# Patient Record
Sex: MALE | Race: WHITE | NOT HISPANIC OR LATINO | Employment: OTHER | URBAN - METROPOLITAN AREA
[De-identification: names, ages, dates, MRNs, and addresses within clinical notes are randomized per-mention and may not be internally consistent; named-entity substitution may affect disease eponyms.]

---

## 2017-08-08 ENCOUNTER — OFFICE VISIT (OUTPATIENT)
Dept: ORTHOPEDIC SURGERY | Facility: CLINIC | Age: 52
End: 2017-08-08

## 2017-08-08 VITALS — BODY MASS INDEX: 30.75 KG/M2 | TEMPERATURE: 98.9 F | HEIGHT: 73 IN | WEIGHT: 232 LBS

## 2017-08-08 DIAGNOSIS — R29.898 WEAKNESS OF RIGHT FOOT: Primary | ICD-10-CM

## 2017-08-08 DIAGNOSIS — M54.50 SPINE PAIN, LUMBAR: Primary | ICD-10-CM

## 2017-08-08 DIAGNOSIS — M54.31 SCIATICA OF RIGHT SIDE: ICD-10-CM

## 2017-08-08 PROCEDURE — 99204 OFFICE O/P NEW MOD 45 MIN: CPT | Performed by: ORTHOPAEDIC SURGERY

## 2017-08-08 PROCEDURE — 72100 X-RAY EXAM L-S SPINE 2/3 VWS: CPT | Performed by: ORTHOPAEDIC SURGERY

## 2017-08-08 NOTE — PROGRESS NOTES
New patient or new problem visit    Chief Complaint   Patient presents with   • Lumbar Spine - Establish Care       HPI: He complains of chronic low back pain but increasing right foot numbness and weakness since June of this year.  He is been seeing Dr. Milo Bowers who ordered an MRI late last year, but Dr. Milo Bowers recently retired.  He is in pain management and is on OxyContin and I am and is undergone epidural injections without substantial relief.  Anus severe grinding stabbing and aching.  His wife is a surgical patient of mine.  Works as a  but his job as being impacted by his pain.    PFSH: See chart- reviewed    Review of Systems   Constitutional: Negative for chills, fever and unexpected weight change.   HENT: Negative.  Negative for trouble swallowing and voice change.    Eyes: Negative.  Negative for visual disturbance.   Respiratory: Negative.  Negative for cough and shortness of breath.    Cardiovascular: Positive for chest pain and leg swelling.   Gastrointestinal: Negative.  Negative for abdominal pain, nausea and vomiting.   Endocrine: Negative.  Negative for cold intolerance and heat intolerance.   Genitourinary: Negative.  Negative for difficulty urinating, frequency and urgency.   Musculoskeletal: Negative.    Skin: Negative.  Negative for rash and wound.   Allergic/Immunologic: Negative.  Negative for immunocompromised state.   Neurological: Positive for weakness and numbness.   Hematological: Bruises/bleeds easily.        Bleed easy   Psychiatric/Behavioral: Negative.  Negative for dysphoric mood. The patient is not nervous/anxious.        PE: Constitutional: Vital signs above-noted.  Awake, alert and oriented    Psychiatric: Affect and insight do not appear grossly disturbed.    Pulmonary: Breathing is unlabored, color is good.    Skin: Warm, dry and normal turgor    Cardiac:  pedal pulses intact.  No edema.    Eyesight and hearing appear adequate for examination  purposes      Musculoskeletal:  There is moderate tenderness to percussion and palpation of the spine. Motion appears stiff.  Posture is unremarkable to coronal and sagittal inspection.    The skin about the area is intact.  There is no palpable or visible deformity.  There is no local spasm.       Neurologic:   Reflexes are 2+ and symmetrical in the patellae and absent in the Achilles.   Motor function is undisturbed in quadriceps, EHL, and gastrocnemius on the left but he has 4/5 weakness in the right EHL.   Sensation appears symmetrically intact to light touch   .  In the bilateral lower extremities there is no evidence of atrophy.   Clonus is absent..  Gait appears undisturbed.  SLR test equivocal on the right      MEDICAL DECISION MAKING    XRAY: Plain film x-rays of the lumbar spine obtained today demonstrate degenerative disc changes of mild extent in satisfactory posture and alignment.  No comparison views are available.  MRI scan of the lumbar spine from November 2016 shows 3 level lumbar disc degeneration at the L3-4, 4-5 and L5-S1.  I see a bit of right L4 5 foraminal stenosis which was not commented on by the radiologist.  The radiologist noted 2 mm of retrolisthesis of L3 on 4.  I agree with the radiologist reading.    Other: n/a    Impression: Putting this together he has a long-standing back pain and multilevel lumbar disc degeneration upon which there is a more subacute history of right leg weakness.  He appears to have the L5 radiculopathy on exam.  The patient thinks that we need a new MRI scan and I agree entirely as this is a new finding.  I will call him with results of the MRI, but unless there is a new lesion which would be amenable to surgery I don't see any other recommendations to add to his care.  I did however reassure him that it would be safe to live with this without fear of much chance of catastrophic deterioration.    Plan: As above

## 2017-08-18 ENCOUNTER — TELEPHONE (OUTPATIENT)
Dept: ORTHOPEDIC SURGERY | Facility: CLINIC | Age: 52
End: 2017-08-18

## 2017-08-18 DIAGNOSIS — R29.898 WEAKNESS OF RIGHT FOOT: ICD-10-CM

## 2019-06-04 NOTE — TELEPHONE ENCOUNTER
"----- Message from Dave Long MD sent at 8/18/2017 12:13 PM EDT -----  Tell him that the MRI demonstrated mild degenerative changes, but no substantial evidence of nerve compression that I can't \"go after\".  At this point I suggest to just stay in pain management as I don't see anything additional to add to his care  "
LEFT MESSAGE FOR PT TO CALL BACK MG  
Patient has been informed.cld  
alone

## 2019-11-07 ENCOUNTER — OFFICE VISIT CONVERTED (OUTPATIENT)
Dept: NEUROSURGERY | Facility: CLINIC | Age: 54
End: 2019-11-07
Attending: PHYSICIAN ASSISTANT

## 2020-01-09 ENCOUNTER — OFFICE VISIT CONVERTED (OUTPATIENT)
Dept: NEUROSURGERY | Facility: CLINIC | Age: 55
End: 2020-01-09
Attending: NEUROLOGICAL SURGERY

## 2020-10-22 ENCOUNTER — OFFICE VISIT (OUTPATIENT)
Dept: ORTHOPEDIC SURGERY | Facility: CLINIC | Age: 55
End: 2020-10-22

## 2020-10-22 VITALS — HEIGHT: 72 IN | BODY MASS INDEX: 33.18 KG/M2 | WEIGHT: 245 LBS | TEMPERATURE: 94.8 F

## 2020-10-22 DIAGNOSIS — G90.521 COMPLEX REGIONAL PAIN SYNDROME I OF RIGHT LOWER LIMB: ICD-10-CM

## 2020-10-22 DIAGNOSIS — Z98.890 S/P PERONEAL TENDON REPAIR: ICD-10-CM

## 2020-10-22 DIAGNOSIS — M79.671 RIGHT FOOT PAIN: Primary | ICD-10-CM

## 2020-10-22 PROCEDURE — 99205 OFFICE O/P NEW HI 60 MIN: CPT | Performed by: ORTHOPAEDIC SURGERY

## 2020-10-22 RX ORDER — CLOPIDOGREL BISULFATE 75 MG/1
TABLET ORAL
COMMUNITY

## 2020-10-22 RX ORDER — ASPIRIN 325 MG
325 TABLET ORAL DAILY
COMMUNITY
End: 2023-02-02

## 2020-10-22 RX ORDER — GABAPENTIN 400 MG/1
CAPSULE ORAL
COMMUNITY

## 2020-10-22 RX ORDER — METOPROLOL SUCCINATE 50 MG/1
TABLET, EXTENDED RELEASE ORAL
COMMUNITY

## 2020-10-22 RX ORDER — SIMVASTATIN 40 MG
40 TABLET ORAL NIGHTLY
COMMUNITY
End: 2022-07-13

## 2020-10-22 RX ORDER — AMITRIPTYLINE HYDROCHLORIDE 25 MG/1
25 TABLET, FILM COATED ORAL
COMMUNITY
Start: 2020-08-06 | End: 2022-07-13

## 2020-10-22 RX ORDER — METFORMIN HYDROCHLORIDE 500 MG/1
TABLET, EXTENDED RELEASE ORAL
COMMUNITY
Start: 2020-08-26 | End: 2022-07-13

## 2020-10-22 NOTE — PROGRESS NOTES
NEW VISIT    Patient: Robson Cohen  ?  YOB: 1965    MRN: [id number]  ?  Chief Complaint   Patient presents with   • Right Ankle - Pain, Establish Care, Injury      ?  HPI: This patient presents to the office with a very complex history.  He was injured in June 2017.  On 10 Anabelle 2017 he had a heavy set of ceramic tile fall onto his right ankle.  He sustained a deep laceration.  Apparently at that time he sustained an injury to the anterior aspect of the ankle and possibly a terminal branch of the common peroneal nerve.  He was initially treated conservatively and then was treated by Dr. Rk Kan a podiatrist in [place].  He underwent a surgical procedure which was related to this damage to the anterior aspect of his leg.  The patient is not quite clear as far as the exact extent of the surgical procedure.  Subsequent to that he continued to have a lot of pain and discomfort over the dorsal aspect of his right great toe.  He was told he has arthritis of the great toe.  A spacer was placed during surgery at a subsequent approach.  He was then told that the spacer had failed and has since undergone a fusion of the MTP joint.  He was also told that he had injury to the peroneus longus tendon and possibly a tear that was addressed with a repair.  The patient has continued to have a lot of pain and discomfort.  He cannot walk for more than 2 to 3 hours.  He has a lot of swelling of the lateral side of the ankle.  He states that he has lost his job as a  because he cannot go up and down ladders because of his right ankle.  He describes his pain as sharp shooting pain with associated burning and stabbing.  The patient was seen at the pain clinic and states that LESI did not help manage his symptoms whatsoever because of continued pain and discomfort.  He has been on Neurontin which is somewhat beneficial in managing his symptoms.  He states that he would like to enjoy life once again  but is unable to do so because of the disabling pain in his foot and ankle.  He has been since placed on an antidepressant by his PCP.    Pain Location: right foot and right ankle  Radiation: From the lateral aspect of the ankle to the anterior aspect with radiation to the dorsal aspect of the foot.  Quality: stabbing, burning  Intensity/Severity: severe  Duration: 3 years  Onset quality: gradual   Timing: constant  Aggravating Factors: rising after sitting and squatting  Alleviating Factors: ambulating  Previous Episodes: yes  Associated Symptoms: decreased ROM, decreased strength, numbness/tingling  ADL Affected: ambulating  Previous Treatment: Prior operations on the foot and ankle x2 in [facility].    This patient is a new patient.  This problem is new to this examiner.      Allergies: No Known Allergies    Medications:   Home Medications:  Current Outpatient Medications on File Prior to Visit   Medication Sig   • amitriptyline (ELAVIL) 25 MG tablet Take 25 mg by mouth every night at bedtime.   • aspirin 325 MG tablet Take 325 mg by mouth Daily.   • clopidogrel (PLAVIX) 75 MG tablet clopidogrel 75 mg tablet   • gabapentin (NEURONTIN) 400 MG capsule gabapentin 400 mg capsule   • metFORMIN ER (GLUCOPHAGE-XR) 500 MG 24 hr tablet TAKE ONE TABLET BY MOUTH EVERY DAY WITH EVENING MEAL TO DECREASE RISK OF DIABETES   • metoprolol succinate XL (TOPROL-XL) 50 MG 24 hr tablet metoprolol succinate ER 50 mg tablet,extended release 24 hr   • simvastatin (ZOCOR) 40 MG tablet simvastatin 40 mg tablet     No current facility-administered medications on file prior to visit.      Current Medications:  Scheduled Meds:  PRN Meds:.    I have reviewed the patient's medical history in detail and updated the computerized patient record.  Review and summarization of old records include:    No past medical history on file.  Past Surgical History:   Procedure Laterality Date   • TENDON REPAIR Right 04/28/2019    ankle   • TOE  "SURGERY Right 04/28/2019    great toe fusion     Social History     Occupational History   • Not on file   Tobacco Use   • Smoking status: Current Every Day Smoker     Packs/day: 0.50     Types: Cigarettes   • Smokeless tobacco: Never Used   Substance and Sexual Activity   • Alcohol use: No   • Drug use: No   • Sexual activity: Defer      Social History     Social History Narrative   • Not on file     No family history on file.      Review of Systems  Constitutional: Negative for appetite change.   HENT: Negative.    Eyes: Negative.    Respiratory: Negative.    Cardiovascular: Negative.    Gastrointestinal: Negative.    Endocrine: Negative.    Genitourinary: Negative.    Musculoskeletal: See details of exam below.  Skin: Negative.    Allergic/Immunologic: Negative.    Hematological: Negative.    Psychiatric/Behavioral: Negative.         Wt Readings from Last 3 Encounters:   10/22/20 111 kg (245 lb)   08/08/17 105 kg (232 lb)     Ht Readings from Last 3 Encounters:   10/22/20 182.9 cm (72\")   08/08/17 185.4 cm (73\")     Body mass index is 33.23 kg/m².  Facility age limit for growth percentiles is 20 years.  Vitals:    10/22/20 0843   Temp: 94.8 °F (34.9 °C)         Physical Exam  Constitutional: Patient is oriented to person, place, and time. Appears well-developed and well-nourished.   HENT:   Head: Normocephalic and atraumatic.   Eyes: Conjunctivae and EOM are normal. Pupils are equal, round, and reactive to light.   Cardiovascular: Normal rate, regular rhythm, normal heart sounds and intact distal pulses.   Pulmonary/Chest: Effort normal and breath sounds normal.   Musculoskeletal:   See detailed exam below   Neurological: Alert and oriented to person, place, and time. No sensory deficit. Coordination normal.   Skin: Skin is warm and dry. Capillary refill takes less than 2 seconds. No rash noted. No erythema.   Psychiatric: Patient has a normal mood and affect. His behavior is normal. Judgment and thought content " normal.   Nursing note and vitals reviewed.      Ortho Exam:   Right foot and ankle.  The patient has multiple healed incisions.  There is one incision laterally over the posterior aspect of the fibula.  Tinel sign is positive over the common peroneal nerve laterally.  He does have swollen and tense shiny skin.  There is some atrophy of the nails.  There is no question that he does appear to have a partly burnt out complex regional pain syndrome/reflex sympathetic dystrophy.  Ankle dorsiflexion 0 to 10 degrees.  Eversion is associated with pain and discomfort.  Ankle plantarflexion 0 to 30 degrees.  He finds it very difficult to circumduct the ankle.  The cap refill is 3 seconds with a delay as would be consistent with vascular issues with the ankle.  The dorsalis pedis artery pulses are palpable.  He does have a healed incision on the dorsal aspect of the first MTP joint of the great toe.  Dorsi and plantar flexion of the great toe MTP joint is significantly restricted consistent with a fusion.  His gait is cautious and somewhat antalgic.  He has a stocking type of edema starting in the supramalleolar region.      Diagnostics:  no diagnostic testing performed this visit   Previously obtained x-rays are reviewed.  The ankle joint itself appears to be fairly well-preserved.    EMG/nerve conduction study images are available in the office today.    There is abnormal EMG study.  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  There is no evidence of any proximal neuropathy.    MRI images from a previous visit are reviewed.  There is evidence of a partial-thickness tear longitudinally oriented involving the peroneus brevis tendon.  There is no evidence of acute ankle injury.    Assessment:  Diagnoses and all orders for this visit:    1. Right foot pain (Primary)  -     Ambulatory Referral to Orthopedic Surgery    2.  S/P peroneal tendon repair  -     Ambulatory Referral to Orthopedic Surgery          Procedures  ?    Plan    · Compression/brace  · Rest, ice, compression, and elevation (RICE) therapy  · Stretching and strengthening exercises of the dorsiflexors and plantar flexors of the ankle.  · This patient has brought me a very difficult problem to treat and at this point I would recommend a multimodal approach for this patient.  · Discussed with him about getting diagnostic caudal blocks at the pain clinic to help manage and diagnose the symptoms of reflex sympathetic dystrophy.  · I will have to refer this patient to Dr. Juan Pablo Conde for subspecialty foot and ankle care because I do not think that he is getting the full extent of foot and ankle subspecialty care at his podiatrist's office.  · Vitamin B6, B12 100 mcg tab 1 p.o. daily for nerve function improvement.  · Systemic vitamin B levels need to be evaluated and he will have his primary care physician help him with that blood level.  · He is already on a prescription of Neurontin 300 mg tab 1 p.o. nightly for nerve symptoms.  · Jobst compression garment stocking, 20/30 compression to help reduce the swelling peripherally.  · Supportive active ankle brace to prevent the ankle from buckling and giving out.  · OTC Tylenol 500-1000mg by mouth every 6 hours as needed for pain   · Follow up in 3 month(s).  · This was a very complex decision making case with reviewing of previous op notes, MRIs and EMG/MRI.  Time spent in the office today with the patient 60 minutes of which greater than 50% of my time was spent face-to-face with the patient going through treatment options and alignment of further care going forward.    Date of encounter: 10/22/2020   Gilson South MD

## 2020-11-17 ENCOUNTER — TELEPHONE (OUTPATIENT)
Dept: ORTHOPEDIC SURGERY | Facility: CLINIC | Age: 55
End: 2020-11-17

## 2021-05-15 VITALS
WEIGHT: 234.19 LBS | SYSTOLIC BLOOD PRESSURE: 120 MMHG | HEIGHT: 72 IN | DIASTOLIC BLOOD PRESSURE: 73 MMHG | BODY MASS INDEX: 31.72 KG/M2

## 2021-05-15 VITALS — HEIGHT: 72 IN | BODY MASS INDEX: 31.42 KG/M2 | WEIGHT: 232 LBS

## 2022-03-30 ENCOUNTER — LAB REQUISITION (OUTPATIENT)
Dept: LAB | Facility: HOSPITAL | Age: 57
End: 2022-03-30

## 2022-03-30 DIAGNOSIS — M20.21 HALLUX RIGIDUS, RIGHT FOOT: ICD-10-CM

## 2022-03-30 PROCEDURE — 88311 DECALCIFY TISSUE: CPT | Performed by: ORTHOPAEDIC SURGERY

## 2022-03-30 PROCEDURE — 88305 TISSUE EXAM BY PATHOLOGIST: CPT | Performed by: ORTHOPAEDIC SURGERY

## 2022-03-31 LAB
LAB AP CASE REPORT: NORMAL
PATH REPORT.FINAL DX SPEC: NORMAL
PATH REPORT.GROSS SPEC: NORMAL

## 2022-07-12 PROBLEM — Z98.61 CAD S/P PERCUTANEOUS CORONARY ANGIOPLASTY: Status: ACTIVE | Noted: 2022-07-12

## 2022-07-12 PROBLEM — I25.10 CAD S/P PERCUTANEOUS CORONARY ANGIOPLASTY: Status: ACTIVE | Noted: 2022-07-12

## 2022-07-12 PROBLEM — E78.5 DYSLIPIDEMIA: Status: ACTIVE | Noted: 2022-07-12

## 2022-07-12 NOTE — PROGRESS NOTES
Chief Complaint  Hyperlipidemia and Hypertension      History of Present Illness  Robson Cohen presents to [facility]  Patient is a 57-year-old with a previous history of CAD with stenting to his RCA with in-stent restenosis and most recently stented in 2016 has had a recurrent issues of chest discomfort.  He has had recurrent episodes of chest discomfort occurring about once a month for the last several years with radiation up his right neck associate with a headache afterwards.  The spells typically last for 20 minutes or they does peer to be any causative triggers to these episodes he denies any nausea vomiting diaphoresis he does get improvement with nitroglycerin use.    Past Medical History:   Diagnosis Date   • CAD (coronary artery disease)    • Hyperlipidemia    • Hypertension          Current Outpatient Medications:   •  aspirin 325 MG tablet, Take 325 mg by mouth Daily., Disp: , Rfl:   •  clopidogrel (PLAVIX) 75 MG tablet, clopidogrel 75 mg tablet, Disp: , Rfl:   •  gabapentin (NEURONTIN) 400 MG capsule, gabapentin 400 mg capsule, Disp: , Rfl:   •  metoprolol succinate XL (TOPROL-XL) 50 MG 24 hr tablet, metoprolol succinate ER 50 mg tablet,extended release 24 hr, Disp: , Rfl:   •  amLODIPine (NORVASC) 2.5 MG tablet, Take 1 tablet by mouth Daily., Disp: 90 tablet, Rfl: 3    Medications Discontinued During This Encounter   Medication Reason   • amitriptyline (ELAVIL) 25 MG tablet *Therapy completed   • metFORMIN ER (GLUCOPHAGE-XR) 500 MG 24 hr tablet *Therapy completed   • simvastatin (ZOCOR) 40 MG tablet      No Known Allergies     Social History     Tobacco Use   • Smoking status: Current Every Day Smoker     Packs/day: 0.25     Types: Cigarettes, Pipe   • Smokeless tobacco: Never Used   Substance Use Topics   • Alcohol use: No   • Drug use: No       Family History   Problem Relation Age of Onset   • Heart attack Mother    • Stroke Mother         Objective     /78    "Pulse 57   Ht 182.9 cm (72\")   Wt 103 kg (226 lb 9.6 oz)   BMI 30.73 kg/m²       Physical Exam    General Appearance:   · no acute distress  · Alert and oriented x3  HENT:   · lips not cyanotic  · Atraumatic  Neck:  · No jvd   · supple  Respiratory:  · no respiratory distress  · normal breath sounds  · no rales  Cardiovascular:  · Regular rate and rhythm  · no S3, no S4   · no murmur  · no rub  Extremities  · No cyanosis  · lower extremity edema: none    Skin:   · warm, dry  · No rashes    Result Review :     No results found for: PROBNP                        ECG 12 Lead    Date/Time: 7/13/2022 12:42 PM  Performed by: Juan Pablo Hogue MD  Authorized by: Juan Pablo Hogue MD   Comparison: compared with previous ECG   Similar to previous ECG  Rhythm: sinus rhythm           No results found for this or any previous visit.             The ASCVD Risk score ([place] BERG Jr., et al., 2013) failed to calculate for the following reasons:    Cannot find a previous HDL lab    Cannot find a previous total cholesterol lab     Diagnoses and all orders for this visit:    1. CAD S/P percutaneous coronary angioplasty (Primary)  Overview:  Stent to rca 2016 for intstent restenosis    Assessment & Plan:  Patient with intermittent episodes of chest discomfort not exertional relieved with nitro longstanding in nature but not remitting his previous work-up had included a stress test over a year ago which did not show ischemia and heart catheterization in 2018 discussed with the patient the possibility that this Pentam's could be vasospastic in nature and recommended a trial of Norvasc 2.5 once a day as he has had problems with headache with long-acting nitro.  In addition he can still continue take as needed doses of his nitroglycerin as needed for chest pain episodes if he was to have chest pain not relieved with 3 nitros recommended going to the hospital via EMS.  Also discussed with him the risk and benefits of further work-up given his " prior work-ups not revealing any significant epicardial disease he felt since it has been sometime since he has had a heart catheterization that it would be worth the risk to take a look again with an invasive strategy.  Did discuss with him the risk of death, heart attack, stroke and kidney dysfunction and he was agreeable to proceeding.  Patient continues to increase his chronic aspirin dose to 81 mg daily      2. Dyslipidemia  Assessment & Plan:  Since patient has had a Norvasc to his medical regiment we will change his Zocor to Pravachol 40 nightly      Other orders  -     amLODIPine (NORVASC) 2.5 MG tablet; Take 1 tablet by mouth Daily.  Dispense: 90 tablet; Refill: 3          Follow Up     Return in about 6 months (around 1/13/2023).          Patient was given instructions and counseling regarding his condition or for health maintenance advice. Please see specific information pulled into the AVS if appropriate.

## 2022-07-13 ENCOUNTER — PREP FOR SURGERY (OUTPATIENT)
Dept: OTHER | Facility: HOSPITAL | Age: 57
End: 2022-07-13

## 2022-07-13 ENCOUNTER — OFFICE VISIT (OUTPATIENT)
Dept: CARDIOLOGY | Facility: CLINIC | Age: 57
End: 2022-07-13

## 2022-07-13 VITALS
HEIGHT: 72 IN | SYSTOLIC BLOOD PRESSURE: 130 MMHG | BODY MASS INDEX: 30.69 KG/M2 | HEART RATE: 57 BPM | WEIGHT: 226.6 LBS | DIASTOLIC BLOOD PRESSURE: 78 MMHG

## 2022-07-13 DIAGNOSIS — E78.5 DYSLIPIDEMIA: ICD-10-CM

## 2022-07-13 DIAGNOSIS — Z98.61 CAD S/P PERCUTANEOUS CORONARY ANGIOPLASTY: Primary | ICD-10-CM

## 2022-07-13 DIAGNOSIS — I20.8 CHRONIC STABLE ANGINA: Primary | ICD-10-CM

## 2022-07-13 DIAGNOSIS — I25.10 CAD S/P PERCUTANEOUS CORONARY ANGIOPLASTY: Primary | ICD-10-CM

## 2022-07-13 PROCEDURE — 99204 OFFICE O/P NEW MOD 45 MIN: CPT | Performed by: INTERNAL MEDICINE

## 2022-07-13 PROCEDURE — 93000 ELECTROCARDIOGRAM COMPLETE: CPT | Performed by: INTERNAL MEDICINE

## 2022-07-13 RX ORDER — AMLODIPINE BESYLATE 2.5 MG/1
2.5 TABLET ORAL DAILY
Qty: 90 TABLET | Refills: 3 | Status: SHIPPED | OUTPATIENT
Start: 2022-07-13

## 2022-07-13 RX ORDER — PRAVASTATIN SODIUM 40 MG
40 TABLET ORAL DAILY
Qty: 90 TABLET | Refills: 3 | Status: SHIPPED | OUTPATIENT
Start: 2022-07-13

## 2022-07-13 NOTE — ASSESSMENT & PLAN NOTE
Patient with intermittent episodes of chest discomfort not exertional relieved with nitro longstanding in nature but not remitting his previous work-up had included a stress test over a year ago which did not show ischemia and heart catheterization in 2018 discussed with the patient the possibility that this Pentam's could be vasospastic in nature and recommended a trial of Norvasc 2.5 once a day as he has had problems with headache with long-acting nitro.  In addition he can still continue take as needed doses of his nitroglycerin as needed for chest pain episodes if he was to have chest pain not relieved with 3 nitros recommended going to the hospital via EMS.  Also discussed with him the risk and benefits of further work-up given his prior work-ups not revealing any significant epicardial disease he felt since it has been sometime since he has had a heart catheterization that it would be worth the risk to take a look again with an invasive strategy.  Did discuss with him the risk of death, heart attack, stroke and kidney dysfunction and he was agreeable to proceeding.  Patient continues to increase his chronic aspirin dose to 81 mg daily

## 2022-07-13 NOTE — ASSESSMENT & PLAN NOTE
Since patient has had a Norvasc to his medical regiment we will change his Zocor to Pravachol 40 nightly

## 2022-07-14 PROBLEM — I20.89 CHRONIC STABLE ANGINA: Status: ACTIVE | Noted: 2022-07-14

## 2022-07-14 PROBLEM — I20.8 CHRONIC STABLE ANGINA: Status: ACTIVE | Noted: 2022-07-14

## 2022-07-25 DIAGNOSIS — Z98.61 CAD S/P PERCUTANEOUS CORONARY ANGIOPLASTY: Primary | ICD-10-CM

## 2022-07-25 DIAGNOSIS — I25.10 CAD S/P PERCUTANEOUS CORONARY ANGIOPLASTY: Primary | ICD-10-CM

## 2022-08-23 DIAGNOSIS — Z98.61 CAD S/P PERCUTANEOUS CORONARY ANGIOPLASTY: ICD-10-CM

## 2022-08-23 DIAGNOSIS — I25.10 CAD S/P PERCUTANEOUS CORONARY ANGIOPLASTY: ICD-10-CM

## 2023-02-02 ENCOUNTER — OFFICE VISIT (OUTPATIENT)
Dept: CARDIOLOGY | Facility: CLINIC | Age: 58
End: 2023-02-02
Payer: MEDICARE

## 2023-02-02 VITALS
DIASTOLIC BLOOD PRESSURE: 95 MMHG | BODY MASS INDEX: 30.75 KG/M2 | SYSTOLIC BLOOD PRESSURE: 130 MMHG | WEIGHT: 227 LBS | HEART RATE: 63 BPM | HEIGHT: 72 IN

## 2023-02-02 DIAGNOSIS — I25.10 CAD S/P PERCUTANEOUS CORONARY ANGIOPLASTY: Primary | ICD-10-CM

## 2023-02-02 DIAGNOSIS — I20.8 CHRONIC STABLE ANGINA: ICD-10-CM

## 2023-02-02 DIAGNOSIS — Z98.61 CAD S/P PERCUTANEOUS CORONARY ANGIOPLASTY: Primary | ICD-10-CM

## 2023-02-02 DIAGNOSIS — E78.5 DYSLIPIDEMIA: ICD-10-CM

## 2023-02-02 PROCEDURE — 99214 OFFICE O/P EST MOD 30 MIN: CPT | Performed by: INTERNAL MEDICINE

## 2023-02-02 RX ORDER — ICOSAPENT ETHYL 1000 MG/1
CAPSULE ORAL
COMMUNITY
Start: 2023-01-12

## 2023-02-02 RX ORDER — FEBUXOSTAT 40 MG/1
40 TABLET, FILM COATED ORAL DAILY
COMMUNITY
Start: 2022-12-28

## 2023-02-02 RX ORDER — ASPIRIN 81 MG/1
81 TABLET, CHEWABLE ORAL DAILY
COMMUNITY

## 2023-02-02 NOTE — PROGRESS NOTES
"Chief Complaint  Coronary Artery Disease and Follow-up (Stress test done would like to discuss )    Subjective    Patient with resolution of his anginal symptoms cannot remember having any chest pain significantly since last visit.  He does not report any myalgias symptoms    Past Medical History:   Diagnosis Date   • CAD (coronary artery disease)    • Hyperlipidemia    • Hypertension          Current Outpatient Medications:   •  amLODIPine (NORVASC) 2.5 MG tablet, Take 1 tablet by mouth Daily., Disp: 90 tablet, Rfl: 3  •  aspirin 81 MG chewable tablet, Chew 81 mg Daily., Disp: , Rfl:   •  clopidogrel (PLAVIX) 75 MG tablet, clopidogrel 75 mg tablet, Disp: , Rfl:   •  febuxostat (ULORIC) 40 MG tablet, Take 40 mg by mouth Daily., Disp: , Rfl:   •  gabapentin (NEURONTIN) 400 MG capsule, gabapentin 400 mg capsule, Disp: , Rfl:   •  metoprolol succinate XL (TOPROL-XL) 50 MG 24 hr tablet, metoprolol succinate ER 50 mg tablet,extended release 24 hr, Disp: , Rfl:   •  pravastatin (Pravachol) 40 MG tablet, Take 1 tablet by mouth Daily., Disp: 90 tablet, Rfl: 3  •  Vascepa 1 g capsule capsule, TAKE TWO CAPSULES BY MOUTH TWICE DAILY WITH FOOD, swallowing whole. DO not chew, open, dissolve, and/or crush, Disp: , Rfl:     Medications Discontinued During This Encounter   Medication Reason   • aspirin 325 MG tablet      No Known Allergies     Social History     Tobacco Use   • Smoking status: Every Day     Packs/day: 0.25     Types: Cigarettes, Pipe   • Smokeless tobacco: Never   Vaping Use   • Vaping Use: Never used   Substance Use Topics   • Alcohol use: No   • Drug use: No       Family History   Problem Relation Age of Onset   • Heart attack Mother    • Stroke Mother         Objective     /95   Pulse 63   Ht 182.9 cm (72\")   Wt 103 kg (227 lb)   BMI 30.79 kg/m²       Physical Exam    General Appearance:   · no acute distress  · Alert and oriented x3  HENT:   · lips not cyanotic  · Atraumatic  Neck:  · No jvd "   · supple  Respiratory:  · no respiratory distress  · normal breath sounds  · no rales  Cardiovascular:  · Regular rate and rhythm  · no S3, no S4   · no murmur  · no rub  Extremities  · No cyanosis  · lower extremity edema: none    Skin:   · warm, dry  · No rashes      Result Review :     No results found for: PROBNP                     Diagnoses and all orders for this visit:    1. CAD S/P percutaneous coronary angioplasty (Primary)  Assessment & Plan:  Patient has not had any significant chest pain the last visit had previously discussed possible heart catheterization you know stress test have been within normal limits given his anginal symptoms.  Since patient symptoms have resolved discussed just medical therapy versus heart catheterization and agreed upon continuing with conservative medical treatment for the time being.  If patient develops any recurrent anginal chest pain symptoms then pursue heart catheterization.  Continue on aspirin/Plavix daily, Norvasc 2.5 daily and Toprol 50 mg once a day      2. Chronic stable angina (HCC)  Assessment & Plan:  Resolution of any anginal symptoms stress test in July/22 did not show any ischemic issues      3. Dyslipidemia  Assessment & Plan:  Continue pravastatin 40 nightly checking lipids to see if at goal    Orders:  -     Lipid Panel; Future  -     Hepatic Function Panel; Future          Follow Up     Return in about 6 months (around 8/2/2023) for EKG with F/U.          Patient was given instructions and counseling regarding his condition or for health maintenance advice. Please see specific information pulled into the AVS if appropriate.

## 2023-02-02 NOTE — ASSESSMENT & PLAN NOTE
Patient has not had any significant chest pain the last visit had previously discussed possible heart catheterization you know stress test have been within normal limits given his anginal symptoms.  Since patient symptoms have resolved discussed just medical therapy versus heart catheterization and agreed upon continuing with conservative medical treatment for the time being.  If patient develops any recurrent anginal chest pain symptoms then pursue heart catheterization.  Continue on aspirin/Plavix daily, Norvasc 2.5 daily and Toprol 50 mg once a day

## 2023-04-26 ENCOUNTER — TELEPHONE (OUTPATIENT)
Dept: GASTROENTEROLOGY | Facility: CLINIC | Age: 58
End: 2023-04-26
Payer: MEDICARE

## 2023-04-26 NOTE — TELEPHONE ENCOUNTER
PCP office contacted our office to ask if patient had been scheduled yet I advised he was scheduled for 3/30/2023 however per chart the patient canceled the appointment. She states the patient wanted to be scheduled with an MD and wanted to know why this was not done I advised at this time Dr.Laura Bloom is not accepting new patients. She states understanding

## 2023-08-24 ENCOUNTER — OFFICE VISIT (OUTPATIENT)
Dept: CARDIOLOGY | Facility: CLINIC | Age: 58
End: 2023-08-24
Payer: MEDICARE

## 2023-08-24 VITALS
HEART RATE: 70 BPM | BODY MASS INDEX: 30.34 KG/M2 | DIASTOLIC BLOOD PRESSURE: 85 MMHG | SYSTOLIC BLOOD PRESSURE: 116 MMHG | HEIGHT: 72 IN | WEIGHT: 224 LBS

## 2023-08-24 DIAGNOSIS — I25.10 CAD S/P PERCUTANEOUS CORONARY ANGIOPLASTY: ICD-10-CM

## 2023-08-24 DIAGNOSIS — Z98.61 CAD S/P PERCUTANEOUS CORONARY ANGIOPLASTY: ICD-10-CM

## 2023-08-24 DIAGNOSIS — E78.5 DYSLIPIDEMIA: Primary | ICD-10-CM

## 2023-08-24 DIAGNOSIS — I20.8 CHRONIC STABLE ANGINA: ICD-10-CM

## 2023-08-24 DIAGNOSIS — Z72.0 TOBACCO USE: ICD-10-CM

## 2023-08-24 RX ORDER — OXYCODONE HYDROCHLORIDE 10 MG/1
10 TABLET ORAL
COMMUNITY
Start: 2023-08-18

## 2023-08-24 RX ORDER — NITROGLYCERIN 0.4 MG/1
0.4 TABLET SUBLINGUAL
COMMUNITY
Start: 2023-04-24

## 2023-08-24 RX ORDER — CLONAZEPAM 0.5 MG
0.5 TABLET ORAL DAILY
COMMUNITY
Start: 2023-08-18

## 2023-08-24 RX ORDER — ERGOCALCIFEROL 1.25 MG/1
1 CAPSULE ORAL WEEKLY
COMMUNITY
Start: 2023-08-12

## 2023-08-24 NOTE — ASSESSMENT & PLAN NOTE
Robson Cohen  reports that he has been smoking cigarettes and pipe. He has been smoking an average of .25 packs per day. He has never used smokeless tobacco.. I have educated him on the risk of diseases from using tobacco products such as cancer, COPD, and heart disease.     I advised him to quit and he is willing to quit. We have discussed the following method/s for tobacco cessation:  Education Material Counseling Prescription Medicaiton.  Together we have set a quit date for 1 week from today.  He will follow up with me in 6 month or sooner to check on his progress.    I spent 4.5 minutes counseling the patient.

## 2023-08-24 NOTE — PROGRESS NOTES
Chief Complaint  Follow-up and Coronary Artery Disease    Subjective    Patient is a 58-year-old with previous CAD and stenting who has had stable chest pain issues no new complaints or problems.  He does not report any increased shortness of breath symptoms  Past Medical History:   Diagnosis Date    CAD (coronary artery disease)     Hyperlipidemia     Hypertension          Current Outpatient Medications:     amLODIPine (NORVASC) 2.5 MG tablet, Take 1 tablet by mouth Daily., Disp: 90 tablet, Rfl: 3    aspirin 81 MG chewable tablet, Chew 1 tablet Daily., Disp: , Rfl:     clopidogrel (PLAVIX) 75 MG tablet, clopidogrel 75 mg tablet, Disp: , Rfl:     febuxostat (ULORIC) 40 MG tablet, Take 1 tablet by mouth Daily., Disp: , Rfl:     gabapentin (NEURONTIN) 400 MG capsule, gabapentin 400 mg capsule, Disp: , Rfl:     KlonoPIN 0.5 MG tablet, Take 1 tablet by mouth Daily., Disp: , Rfl:     metoprolol succinate XL (TOPROL-XL) 50 MG 24 hr tablet, metoprolol succinate ER 50 mg tablet,extended release 24 hr, Disp: , Rfl:     nitroglycerin (NITROSTAT) 0.4 MG SL tablet, Place 1 tablet under the tongue Every 5 (Five) Minutes As Needed., Disp: , Rfl:     oxyCODONE (ROXICODONE) 10 MG tablet, Take 1 tablet by mouth Every 3 (Three) Hours As Needed., Disp: , Rfl:     pravastatin (Pravachol) 40 MG tablet, Take 1 tablet by mouth Daily., Disp: 90 tablet, Rfl: 3    Vascepa 1 g capsule capsule, TAKE TWO CAPSULES BY MOUTH TWICE DAILY WITH FOOD, swallowing whole. DO not chew, open, dissolve, and/or crush, Disp: , Rfl:     vitamin D (ERGOCALCIFEROL) 1.25 MG (26031 UT) capsule capsule, Take 1 capsule by mouth 1 (One) Time Per Week., Disp: , Rfl:     nicotine (Nicoderm CQ) 7 MG/24HR patch, Place 1 patch on the skin as directed by provider Daily., Disp: 30 patch, Rfl: 5    There are no discontinued medications.  No Known Allergies     Social History     Tobacco Use    Smoking status: Every Day     Packs/day: 0.25     Types: Cigarettes, Pipe     "Smokeless tobacco: Never   Vaping Use    Vaping Use: Never used   Substance Use Topics    Alcohol use: No    Drug use: No       Family History   Problem Relation Age of Onset    Heart attack Mother     Stroke Mother         Objective     /85   Pulse 70   Ht 182.9 cm (72\")   Wt 102 kg (224 lb)   BMI 30.38 kg/mý       Physical Exam    General Appearance:   no acute distress  Alert and oriented x3  HENT:   lips not cyanotic  Atraumatic  Neck:  No jvd   supple  Respiratory:  no respiratory distress  normal breath sounds  no rales  Cardiovascular:  Regular rate and rhythm  no S3, no S4   no murmur  no rub  Extremities  No cyanosis  lower extremity edema: none    Skin:   warm, dry  No rashes      Result Review :     No results found for: PROBNP       No results found for: TSH   No results found for: FREET4   No results found for: DDIMERQUANT  No results found for: MG   No results found for: DIGOXIN   No results found for: TROPONINT          No results found for: POCTROP         ECG 12 Lead    Date/Time: 8/24/2023 10:17 AM  Performed by: Juan Pablo Hogue MD  Authorized by: Juan Pablo Hogue MD   Comparison: compared with previous ECG   Similar to previous ECG               Diagnoses and all orders for this visit:    1. Dyslipidemia (Primary)  Assessment & Plan:  Continue with pravastatin 40 nightly repeat lipids LFTs to see if at goal    Orders:  -     Lipid Panel; Future  -     Hepatic Function Panel; Future    2. CAD S/P percutaneous coronary angioplasty  Assessment & Plan:  Insert aspirin 81 and Plavix 75 daily    Orders:  -     ECG 12 Lead    3. Chronic stable angina  Assessment & Plan:  Continue with Toprol 50 and amlodipine 2.5 mg once a day doing well symptomatically currently      4. Tobacco use  Assessment & Plan:  Robson Cohen  reports that he has been smoking cigarettes and pipe. He has been smoking an average of .25 packs per day. He has never used smokeless tobacco.. I have educated him on the risk of " diseases from using tobacco products such as cancer, COPD, and heart disease.     I advised him to quit and he is willing to quit. We have discussed the following method/s for tobacco cessation:  Education Material Counseling Prescription Medicaiton.  Together we have set a quit date for 1 week from today.  He will follow up with me in 6 month or sooner to check on his progress.    I spent 4.5 minutes counseling the patient.           Other orders  -     nicotine (Nicoderm CQ) 7 MG/24HR patch; Place 1 patch on the skin as directed by provider Daily.  Dispense: 30 patch; Refill: 5            Follow Up     Return in about 6 months (around 2/24/2024) for Follow with Nyasia Cruz.          Patient was given instructions and counseling regarding his condition or for health maintenance advice. Please see specific information pulled into the AVS if appropriate.

## 2024-06-19 ENCOUNTER — TELEPHONE (OUTPATIENT)
Dept: CARDIOLOGY | Facility: CLINIC | Age: 59
End: 2024-06-19
Payer: MEDICARE

## 2024-06-19 NOTE — TELEPHONE ENCOUNTER
Procedure: Spinal Cord Stimulator Trial    Med Directive: Plavix 10-14 days    PMH:  CAD with stent 2016, HLD    Last Seen: 8/24/23

## 2024-06-19 NOTE — TELEPHONE ENCOUNTER
The [facility] received a fax that requires your attention. The document has been indexed to the patient’s chart for your review.      Reason for sending: EXTERNAL MEDICAL RECORD NOTIFICATION    Documents Description: CARDIAC CLEARANCE    Name of Sender: DR JEAN CARLOS WHITFIELD WITH [facility]     Date Indexed: 6/19/24    Notes (if needed): SEE FAX IN CHART UNDER CARE COORDINATION

## 2025-01-16 ENCOUNTER — TELEPHONE (OUTPATIENT)
Dept: CARDIOLOGY | Facility: CLINIC | Age: 60
End: 2025-01-16
Payer: MEDICARE

## 2025-01-16 NOTE — TELEPHONE ENCOUNTER
Caller: Robson Cohen    Relationship to patient: Self    Best call back number:   Telephone Information:   Mobile [phone]       Chief complaint: NOT FEELING VERY WELL    Type of visit: FOLLOW UP    Requested date: ASAP       Additional notes:LAST SEEN 8-24-23

## 2025-03-25 ENCOUNTER — TELEPHONE (OUTPATIENT)
Dept: CARDIOLOGY | Facility: CLINIC | Age: 60
End: 2025-03-25
Payer: MEDICARE

## 2025-03-25 NOTE — TELEPHONE ENCOUNTER
SW patient. Patient states he has not been taking Vascepa due to under prior insurance it was too expensive.

## 2025-03-25 NOTE — TELEPHONE ENCOUNTER
----- Message from Nyasia Cruz sent at 3/25/2025  9:11 AM EDT -----  Labs reviewed, triglycerides are very elevated, find out if compliant with vascepa  ----- Message -----  From: Maryjane Melton RegSched Rep  Sent: 3/25/2025   7:27 AM EDT  To: RILEY Islas

## 2025-03-26 ENCOUNTER — SPECIALTY PHARMACY (OUTPATIENT)
Facility: HOSPITAL | Age: 60
End: 2025-03-26
Payer: MEDICARE

## 2025-03-26 RX ORDER — ICOSAPENT ETHYL 1000 MG/1
2 CAPSULE ORAL 2 TIMES DAILY WITH MEALS
Qty: 360 CAPSULE | Refills: 3 | Status: SHIPPED | OUTPATIENT
Start: 2025-03-26

## 2025-03-26 NOTE — PROGRESS NOTES
Specialty Pharmacy Patient Management Program  Cardiology Initial Assessment     Robson Cohen was referred by a Cardiology provider to the Cardiology Patient Management program offered by [facility] for Hyperlipidemia on 03/26/25.  An initial outreach was conducted, including assessment of therapy appropriateness and specialty medication education for Vascepa. The patient was introduced to services offered by [facility], including: regular assessments, refill coordination, mail order delivery options, prior authorization maintenance, and financial assistance programs as applicable. The patient was also provided with contact information for the pharmacy team.     Insurance Coverage & Financial Support  [facility] Medicare D and [employer]     Relevant Past Medical History and Comorbidities  Relevant medical history and concomitant health conditions were discussed with the patient. The patient's chart has been reviewed for relevant past medical history and comorbid conditions and updated as necessary.  Past Medical History:   Diagnosis Date    CAD (coronary artery disease)     Hyperlipidemia     Hypertension      Social History     Socioeconomic History    Marital status:    Tobacco Use    Smoking status: Every Day     Current packs/day: 0.25     Types: Cigarettes, Pipe    Smokeless tobacco: Never   Vaping Use    Vaping status: Never Used   Substance and Sexual Activity    Alcohol use: No    Drug use: No    Sexual activity: Defer       Problem list reviewed by Paty Lion RPH on 3/26/2025 at  9:37 AM    Allergies  Known allergies and reactions were discussed with the patient. The patient's chart has been reviewed for  allergy information and updated as necessary.   No Known Allergies    Allergies reviewed by Paty Lion RPH on 3/26/2025 at  9:37 AM    Relevant Laboratory Values  Relevant laboratory values were discussed  "with the patient. The following specialty medication dose adjustment(s) are recommended: Vascepa    No results found for: \"GLUCOSE\", \"CALCIUM\", \"NA\", \"K\", \"CO2\", \"CL\", \"BUN\", \"CREATININE\", \"EGFRIFAFRI\", \"EGFRIFNONA\", \"BCR\", \"ANIONGAP\"  No results found for: \"CHOL\", \"CHLPL\", \"TRIG\", \"HDL\", \"LDL\", \"LDLDIRECT\"      Current Medication List  This medication list has been reviewed with the patient and evaluated for any interactions or necessary modifications/recommendations, and updated to include all prescription medications, OTC medications, and supplements the patient is currently taking.  This list reflects what is contained in the patient's profile, which has also been marked as reviewed to communicate to other providers it is the most up to date version of the patient's current medication therapy.     Current Outpatient Medications:     Vascepa 1 g capsule capsule, Take 2 g by mouth 2 (Two) Times a Day With Meals., Disp: 360 capsule, Rfl: 3    amLODIPine (NORVASC) 2.5 MG tablet, Take 1 tablet by mouth Daily., Disp: 90 tablet, Rfl: 3    aspirin 81 MG chewable tablet, Chew 1 tablet Daily., Disp: , Rfl:     clopidogrel (PLAVIX) 75 MG tablet, clopidogrel 75 mg tablet, Disp: , Rfl:     febuxostat (ULORIC) 40 MG tablet, Take 1 tablet by mouth Daily., Disp: , Rfl:     gabapentin (NEURONTIN) 400 MG capsule, gabapentin 400 mg capsule, Disp: , Rfl:     KlonoPIN 0.5 MG tablet, Take 1 tablet by mouth Daily., Disp: , Rfl:     metoprolol succinate XL (TOPROL-XL) 50 MG 24 hr tablet, metoprolol succinate ER 50 mg tablet,extended release 24 hr, Disp: , Rfl:     nicotine (Nicoderm CQ) 7 MG/24HR patch, Place 1 patch on the skin as directed by provider Daily., Disp: 30 patch, Rfl: 5    nitroglycerin (NITROSTAT) 0.4 MG SL tablet, Place 1 tablet under the tongue Every 5 (Five) Minutes As Needed., Disp: , Rfl:     oxyCODONE (ROXICODONE) 10 MG tablet, Take 1 tablet by mouth Every 3 (Three) Hours As Needed., Disp: , Rfl:     pravastatin " (Pravachol) 40 MG tablet, Take 1 tablet by mouth Daily., Disp: 90 tablet, Rfl: 3    vitamin D (ERGOCALCIFEROL) 1.25 MG (73991 UT) capsule capsule, Take 1 capsule by mouth 1 (One) Time Per Week., Disp: , Rfl:     Medicines reviewed by Paty Lion [facility] on 3/26/2025 at  9:37 AM    Drug Interactions  None    Initial Education Provided for Specialty Medication  The patient has been provided with the following education and any applicable administration techniques (i.e. self-injection) have been demonstrated for the therapies indicated. All questions and concerns have been addressed prior to the patient receiving the medication, and the patient has verbalized comprehension of the education and any materials provided. Additional patient education shall be provided and documented upon request by the patient, provider, or payer.    VASCEPA® (icosapent ethyl)  Medication Expectations   Why am I taking this medication? You are taking this medication to help lower the level of a type of fat called triglycerides in your blood.     What should I expect while on this medication? It is reasonable to expect your triglyceride level to decrease.  This medication may be added to other medications that reduce cholesterol levels (statins).    How does the medication work? Vascepa works by lowering the amount of triglycerides made in the liver. It can also help to remove triglycerides from your blood.    How long will I be on this medication for? The amount of time you will be on this medication will be determined by your doctor. It is possible you will be on this medication or a similar medication another throughout your life. Do not abruptly stop this or any medication without talking to your doctor first.   How do I take this medication? Take as directed on your prescription label.  This medication is usually taken twice a day with food. You should take Vascepa capsules whole. Do not break, open, crush, dissolve, or  chew.    What are some possible side effects? You may experience increased risk of bleeding when taking Vascepa. You should monitor for signs of bleeding such as bruising, blood in your urine or stool, nosebleeds without a known cause, or bleeding that won't stop from a cut or injury.  This risk is increased if you take medications that affect blood clotting (anti-platelets or blood thinners). Other side effects could include irregular heartbeat, joint pain, constipation, and dizziness. You should call your doctor if you notice any of these side effects.     What happens if I miss a dose? If you miss a dose, take it as soon as you remember. If it is close to your next dose, skip it (do not take 2 doses at once)     Medication Safety   What are things I should warn my doctor immediately about? Tell your doctor if you have an allergy or sensitivity to fish or shellfish. Stop taking Vascepa and tell your doctor right away or get emergency medical help if you have any signs or symptoms of an allergic reaction (rash, hives, difficulty breathing, swelling of the lips/tongue/face, etc.).  You should also tell your doctor if you have liver or heart rhythm problems (a-fib or atrial flutter), or are experiencing the signs and symptoms of bleeding mentioned above.   What are things that I should be cautious or aware of? Be cautious of any side effects from this medication. Talk to your doctor if any new ones develop or aren't getting better. Vascepa should be used with a healthy diet and regular exercise.    What are some medications that can interact with this one? Some medications that can interact with Vascepa include medications that affect blood clotting (anti-platelets and blood thinners) and Ibrutinib.  Your doctor will monitor you closely for interactions and may adjust the dose of these medications to reduce the chance that they will interact with Vascepa. Always tell your doctor or pharmacist immediately if you start  taking any new medications, including over-the-counter medications, vitamins, and herbal supplements.      Medication Storage/Handling   How should I handle this medication? Keep this medication out of reach of pets/children in tightly sealed container.    How does this medication need to be stored? Store at room temperature and keep dry (don't keep in bathroom or other room that is humid or has a lot of moisture)    How should I dispose of this medication? There should not be a need to dispose of this medication unless your provider decides to change the dose or therapy. If that is the case, take to your local police station for proper disposal. Some pharmacies also have take-back bins for medication disposal.      Resources/Support   How can I remind myself to take this medication? You can download reminder apps to help you manage your refills. You may also set an alarm on your phone to remind you. The pharmacy carries pill boxes that you can place next to an area you pass everyday (such as where you place your car keys or where you charge your phone)   Is financial support available?  Ask your doctor or pharmacist if there are programs for financial support. The drug  ([employer]) can also provide co-pay cards if you have commercial insurance or patient assistance if you have Medicare or no insurance.  Go to vascepa.com for details.    Which vaccines are recommended for me? Talk to your doctor about these vaccines that may be recommended: Flu, Coronavirus (COVID-19), Pneumococcal (pneumonia), Tdap, Hepatitis B, Zoster (shingles)        Adherence and Self-Administration  Adherence related to the patient's specialty therapy was discussed with the patient. The Adherence segment of this outreach has been reviewed and updated.     Is there a concern with patient's ability to self administer the medication correctly and without issue?: No  Were any potential barriers to adherence identified during the initial  assessment or patient education?: No  Are there any concerns regarding the patient's understanding of the importance of medication adherence?: No  Methods for Supporting Patient Adherence and/or Self-Administration: None    Open Medication Therapy Problems  No medication therapy recommendations to display    Goals of Therapy  Goals related to the patient's specialty therapy were discussed with the patient. The Patient Goals segment of this outreach has been reviewed and updated.   Goals Addressed Today        Specialty Pharmacy General Goal      Triglycerides < 150 mg/dL    3/17/2025  406 mg/dL  5/10/2024  288 mg/dL  3/21/2022  326 mg/dL               Reassessment Plan & Follow-Up  1. Medication Therapy Changes: Vascepa 2grams by mouth twice daily  2. Related Plans, Therapy Recommendations, or Therapy Problems to Be Addressed:  None  3. Pharmacist to perform regular assessments no more than (6) months from the previous assessment.  4. Care Coordinator to set up future refill outreaches, coordinate prescription delivery, and escalate clinical questions to pharmacist.  5. Welcome information and patient satisfaction survey to be sent by specialty pharmacy team with patient's initial fill.    Attestation  Therapeutic appropriateness: Appropriate   I attest the patient was actively involved in and has agreed to the above plan of care. If the prescribed therapy is at any point deemed not appropriate based on the current or future assessments, a consultation will be initiated with the patient's specialty care provider to determine the best course of action. The revised plan of therapy will be documented along with any required assessments and/or additional patient education provided.     Paty Merchant PharmD  Clinical Specialty Pharmacist, Cardiology  3/26/2025  09:46 EDT

## 2025-04-09 ENCOUNTER — OFFICE VISIT (OUTPATIENT)
Dept: GASTROENTEROLOGY | Facility: CLINIC | Age: 60
End: 2025-04-09
Payer: MEDICARE

## 2025-04-09 VITALS
OXYGEN SATURATION: 100 % | BODY MASS INDEX: 30.61 KG/M2 | HEIGHT: 72 IN | SYSTOLIC BLOOD PRESSURE: 107 MMHG | WEIGHT: 226 LBS | DIASTOLIC BLOOD PRESSURE: 73 MMHG | HEART RATE: 61 BPM

## 2025-04-09 DIAGNOSIS — R16.0 ENLARGED LIVER: ICD-10-CM

## 2025-04-09 DIAGNOSIS — K76.0 FATTY LIVER: Primary | ICD-10-CM

## 2025-04-09 DIAGNOSIS — Z87.898 HISTORY OF ALCOHOL USE: ICD-10-CM

## 2025-04-09 DIAGNOSIS — E66.811 CLASS 1 OBESITY WITH BODY MASS INDEX (BMI) OF 30.0 TO 30.9 IN ADULT, UNSPECIFIED OBESITY TYPE, UNSPECIFIED WHETHER SERIOUS COMORBIDITY PRESENT: ICD-10-CM

## 2025-04-09 PROCEDURE — 1160F RVW MEDS BY RX/DR IN RCRD: CPT

## 2025-04-09 PROCEDURE — 1159F MED LIST DOCD IN RCRD: CPT

## 2025-04-09 PROCEDURE — G2211 COMPLEX E/M VISIT ADD ON: HCPCS

## 2025-04-09 PROCEDURE — 99204 OFFICE O/P NEW MOD 45 MIN: CPT

## 2025-04-09 RX ORDER — CHOLECALCIFEROL (VITAMIN D3) 25 MCG
1000 CAPSULE ORAL DAILY
COMMUNITY

## 2025-04-09 RX ORDER — PRASUGREL 10 MG/1
10 TABLET, FILM COATED ORAL DAILY
COMMUNITY

## 2025-04-09 RX ORDER — EVOLOCUMAB 140 MG/ML
INJECTION, SOLUTION SUBCUTANEOUS
COMMUNITY

## 2025-04-09 RX ORDER — PANTOPRAZOLE SODIUM 40 MG
1 TABLET, DELAYED RELEASE (ENTERIC COATED) ORAL
COMMUNITY
Start: 2025-03-17

## 2025-04-09 NOTE — PROGRESS NOTES
Chief Complaint  Cirrhosis, Abdominal Pain (Right side ), Bloated, and Heartburn    Patient or patient representative verbalized consent for the use of Ambient Listening during the visit with  RILYE Hernandez for chart documentation. 4/9/2025  14:32 EDT    Robson Cohen is a 60 y.o. male who presents to [facility] on referral from Savi Aguilar MD for a gastroenterology evaluation of fatty liver.      History of Present Illness  The patient is a 60-year-old male who presents to the office for fatty liver and heartburn.    He reports persistent discomfort in the right upper quadrant for years now. An ultrasound revealed an enlarged fatty liver, a diagnosis he has not previously received. He reports no family history of hepatic disorders or exposure to hepatitis C. He also reports no history of blood transfusions during infancy. He has been abstinent from alcohol for the past 9 years, following a period of daily beer consumption.    He has been on pantoprazole for approximately 5 years, taking it as needed, particularly after consuming foods such as spaghetti or chili. He finds this medication effective. He reports no nausea, vomiting, dysphagia, hematochezia, or melena.      US abdomen 3/14/2025-enlarged fatty liver    Colonoscopy last year at [facility] which was normal    Past Medical History:   Diagnosis Date    CAD (coronary artery disease)     Hyperlipidemia     Hypertension        Past Surgical History:   Procedure Laterality Date    CAROTID STENT      COLONOSCOPY      CORONARY STENT PLACEMENT      TENDON REPAIR Right 04/28/2019    ankle    TOE SURGERY Right 04/28/2019    great toe fusion         Current Outpatient Medications:     amLODIPine (NORVASC) 2.5 MG tablet, Take 1 tablet by mouth Daily., Disp: 90 tablet, Rfl: 3    aspirin 81 MG chewable tablet, Chew 1 tablet Daily., Disp: , Rfl:     CeleBREX 200 MG capsule, Take  by mouth., Disp: , Rfl:      Cholecalciferol (Vitamin D-3) 25 MCG (1000 UT) capsule, Take 1,000 Units by mouth Daily., Disp: , Rfl:     clopidogrel (PLAVIX) 75 MG tablet, clopidogrel 75 mg tablet, Disp: , Rfl:     febuxostat (ULORIC) 40 MG tablet, Take 1 tablet by mouth Daily., Disp: , Rfl:     gabapentin (NEURONTIN) 400 MG capsule, gabapentin 400 mg capsule, Disp: , Rfl:     KlonoPIN 0.5 MG tablet, Take 1 tablet by mouth Daily., Disp: , Rfl:     metoprolol succinate XL (TOPROL-XL) 50 MG 24 hr tablet, metoprolol succinate ER 50 mg tablet,extended release 24 hr, Disp: , Rfl:     nitroglycerin (NITROSTAT) 0.4 MG SL tablet, Place 1 tablet under the tongue Every 5 (Five) Minutes As Needed., Disp: , Rfl:     oxyCODONE (ROXICODONE) 10 MG tablet, Take 1 tablet by mouth Every 3 (Three) Hours As Needed., Disp: , Rfl:     pravastatin (Pravachol) 40 MG tablet, Take 1 tablet by mouth Daily., Disp: 90 tablet, Rfl: 3    Protonix 40 MG EC tablet, Take 1 tablet by mouth., Disp: , Rfl:     Repatha SureClick solution auto-injector SureClick injection, INJECT 140mg SUBCUTANEOUSLY every TWO WEEKS, Disp: , Rfl:     Vascepa 1 g capsule capsule, Take 2 g by mouth 2 (Two) Times a Day With Meals., Disp: 360 capsule, Rfl: 3    vitamin D (ERGOCALCIFEROL) 1.25 MG (91822 UT) capsule capsule, Take 1 capsule by mouth 1 (One) Time Per Week., Disp: , Rfl:     nicotine (Nicoderm CQ) 7 MG/24HR patch, Place 1 patch on the skin as directed by provider Daily. (Patient not taking: Reported on 4/9/2025), Disp: 30 patch, Rfl: 5    prasugrel (EFFIENT) 10 MG tablet, Take 1 tablet by mouth Daily. (Patient not taking: Reported on 4/9/2025), Disp: , Rfl:      Allergies   Allergen Reactions    Atorvastatin Unknown (See Comments)    Calcium Unknown (See Comments)    Influenza Virus Vaccine Unknown (See Comments)       Family History   Problem Relation Age of Onset    Heart attack Mother     Stroke Mother     Colon cancer Neg Hx         Social History     Social History Narrative    Not on  "file       Immunization:    There is no immunization history on file for this patient.     Objective     Vital Signs:   /73 (BP Location: Left arm, Patient Position: Sitting, Cuff Size: Adult)   Pulse 61   Ht 182.9 cm (72.01\")   Wt 103 kg (226 lb)   SpO2 100%   BMI 30.64 kg/m²       Physical Exam  Constitutional:       Appearance: Normal appearance. He is normal weight.   HENT:      Head: Normocephalic and atraumatic.      Nose: Nose normal.   Pulmonary:      Effort: Pulmonary effort is normal.   Skin:     General: Skin is warm and dry.   Neurological:      Mental Status: He is alert and oriented to person, place, and time. Mental status is at baseline.   Psychiatric:         Mood and Affect: Mood normal.         Behavior: Behavior normal.         Thought Content: Thought content normal.         Judgment: Judgment normal.         Result Review :                     Assessment and Plan    Diagnoses and all orders for this visit:    1. Fatty liver (Primary)  -     Liver Elastography; Future    2. Enlarged liver    3. History of alcohol use    4. Class 1 obesity with body mass index (BMI) of 30.0 to 30.9 in adult, unspecified obesity type, unspecified whether serious comorbidity present      Assessment & Plan  1. Hepatic steatosis.  Recent laboratory results indicate normal hepatic enzyme levels and platelet count. The pathophysiology of hepatic steatosis was discussed in detail. A FibroScan will be ordered to assess the degree of hepatic steatosis. He was advised to adopt a low-carbohydrate, low-sugar diet, focusing primarily on protein intake for energy. He was also encouraged to maintain his current state of sobriety. The results of the FibroScan will be communicated to him upon receipt.    2. Fatigue.  Nutrient levels are within normal ranges. The potential impact of sleep apnea on his fatigue was discussed. He was advised to consider reassessment for sleep apnea or refitting of his CPAP machine. He was " also recommended to perform nasal rinses and use Flonase prior to bedtime. A follow-up appointment with his ENT specialist was suggested to address his sinus issues.    3. Heartburn.  He has been using pantoprazole (Protonix) as needed for heartburn for approximately 5 years. He was advised to continue using pantoprazole as needed and to avoid foods that trigger his symptoms.    Follow-up  The patient will follow up in 6 months.      * Surgery not found *        Follow Up   Return in about 6 months (around 10/9/2025).  Patient was given instructions and counseling regarding his condition or for health maintenance advice. Please see specific information pulled into the AVS if appropriate.

## 2025-04-09 NOTE — PATIENT INSTRUCTIONS
Fatty Liver Disease (Steatotic Liver Disease): What to Know    Your liver is an organ with many jobs. It makes proteins and helps change food into energy. It also gets rid of harmful things in your blood and absorbs vitamins from food.  Fatty liver disease happens when too much fat builds up in your liver cells. It's also called steatotic liver disease.  In many cases, fatty liver disease doesn't cause symptoms. But over time, it can cause irritation and swelling. This can lead to other liver problems, such as:  Cirrhosis, or scarring of the liver.  Liver cancer.  Liver failure.  What are the causes?  Fatty liver disease may be caused by:  Being overweight.  Having:  High cholesterol.  High blood pressure.  Cushing syndrome.  Not getting enough nutrients in your diet.  Other causes include:  Certain drugs.  Poisons.  Some infections caused by a germ called a virus.  What increases the risk?  You're more likely to get fatty liver disease if:  You drink alcohol.  You're overweight.  You have diabetes.  You have hepatitis.  You have a high triglyceride level.  You're pregnant.  What are the signs or symptoms?  You may not have symptoms. If you do, they may include:  Feeling weak and tired.  Losing weight.  Feeling like you may throw up.  Throwing up.  Jaundice. This is when your skin or the white parts of your eyes turn yellow.  Swelling in your belly or legs.  Tenderness in the right-upper part of your belly.  How is this diagnosed?  Fatty liver disease may be diagnosed based on your medical history and an exam. You may also need tests. These may include:  Blood tests.  An ultrasound.  A CT scan.  An MRI.  A biopsy. This is when a small piece of tissue is removed from your liver for testing.  How is this treated?  Fatty liver disease is often caused by other conditions. You may need to take medicines and make changes to your daily life. These changes may help you manage conditions, such as:  Alcohol use disorder. This  is a condition where you may not be able to stop drinking.  High cholesterol.  Diabetes.  Being overweight.  Follow these instructions at home:  Eat healthy. Work with your health care provider or an expert in healthy eating called a dietitian. They can help you make an eating plan.  Get enough exercise.  This can help you lose weight. It can also help you manage your cholesterol and diabetes.  Talk to your provider about an exercise plan. Ask what things are best for you to do.  Do not drink alcohol. If you have trouble quitting, ask your provider for help.  Take your medicines only as told.  Keep all follow-up visits. Your provider will check if you're getting better.  Contact a health care provider if:  You can't control your blood sugar. This is extra important if you have diabetes.  You have a fever.  You have swelling in your belly or legs.  You have belly pain.  You have jaundice.  You feel like you may throw up.  You throw up.  Get help right away if:  You throw up, and it looks like:  Bright red blood.  Coffee grounds.  You throw up something that looks like coffee ground.  Your poop looks bloody or black.  You get confused.  These symptoms may be an emergency. Call 911 right away.  Do not wait to see if the symptoms will go away.  Do not drive yourself to the hospital.  This information is not intended to replace advice given to you by your health care provider. Make sure you discuss any questions you have with your health care provider.  Document Revised: 06/06/2024 Document Reviewed: 06/06/2024  [employer] Patient Education © 2024 [employer]

## 2025-04-10 ENCOUNTER — PATIENT ROUNDING (BHMG ONLY) (OUTPATIENT)
Dept: GASTROENTEROLOGY | Facility: CLINIC | Age: 60
End: 2025-04-10
Payer: MEDICARE

## 2025-04-10 NOTE — PROGRESS NOTES
"4/10/2025      Hello, may I speak with Robson Cohen     My name is Arabella. I am calling from [facility] [place]. I show that you had a recent visit with RILEY Tapia.    Before we get started may I verify your date of birth? 1965    I am calling to officially welcome you to our practice and ask about your recent visit. Is this a good time to talk?  Yes     Tell me about your visit with us. What things went well? \"Vangie was very pleasant & informative, it was a great visit\"    We strive to ensure that we protect your safety and privacy. Is there anything we could have done to improve this during your visit?    No    We're always looking for ways to make our patients' experiences even better. Do you have recommendations on ways we may improve? \"Accept my insurance\"    Overall were you satisfied with your first visit to our practice?  Yes     I appreciate you taking the time to speak with me today. Is there anything else I can do for you?   No     I am glad to hear that you had a very good visit and I appreciate you taking the time to provide feedback on this call. We would greatly appreciate you filling out a survey if you receive one in the mail, email or text. This is a great opportunity to provide any additional feedback that you may think of after this call as well.       Thank you, and have a great day.   "

## 2025-06-30 ENCOUNTER — SPECIALTY PHARMACY (OUTPATIENT)
Dept: CARDIOLOGY | Facility: CLINIC | Age: 60
End: 2025-06-30
Payer: MEDICARE

## 2025-06-30 NOTE — PROGRESS NOTES
Specialty Pharmacy Patient Management Program  Refill Outreach     Robson was contacted today regarding refills of their medication(s).    Refill Questions      Flowsheet Row Most Recent Value   Changes to allergies? No   Changes to medications? No   New conditions or infections since last clinic visit No   Unplanned office visit, urgent care, ED, or hospital admission in the last 4 weeks  No   How does patient/caregiver feel medication is working? Good   Financial problems or insurance changes  No   Since the previous refill, were any specialty medication doses or scheduled injections missed or delayed?  No   Does this patient require a clinical escalation to a pharmacist? No            Delivery Questions      Flowsheet Row Most Recent Value   Delivery method  at Pharmacy   Delivery address verified with patient/caregiver? Yes   Delivery address --  [ at Pharmacy]   Number of medications in delivery 1   Medication(s) being filled and delivered Icosapent Ethyl (Vascepa)   Doses left of specialty medications 4   Copay verified? Yes   Copay amount $0.00   Copay form of payment No copayment ($0)   Delivery Date Selection 07/01/25   Signature Required No   Do you consent to receive electronic handouts?  No                 Follow-up: 86 day(s)     Karl Kraus, Pharmacy Technician  6/30/2025  09:19 EDT

## 2025-07-01 ENCOUNTER — OFFICE VISIT (OUTPATIENT)
Dept: CARDIOLOGY | Facility: CLINIC | Age: 60
End: 2025-07-01
Payer: MEDICARE

## 2025-07-01 VITALS
HEIGHT: 72 IN | WEIGHT: 230.2 LBS | BODY MASS INDEX: 31.18 KG/M2 | SYSTOLIC BLOOD PRESSURE: 139 MMHG | DIASTOLIC BLOOD PRESSURE: 79 MMHG | HEART RATE: 70 BPM

## 2025-07-01 DIAGNOSIS — Z98.61 CAD S/P PERCUTANEOUS CORONARY ANGIOPLASTY: Primary | ICD-10-CM

## 2025-07-01 DIAGNOSIS — I25.10 CAD S/P PERCUTANEOUS CORONARY ANGIOPLASTY: Primary | ICD-10-CM

## 2025-07-01 DIAGNOSIS — E78.5 DYSLIPIDEMIA: ICD-10-CM

## 2025-07-01 DIAGNOSIS — I10 PRIMARY HYPERTENSION: ICD-10-CM

## 2025-07-01 DIAGNOSIS — R29.818 SUSPECTED SLEEP APNEA: ICD-10-CM

## 2025-07-01 PROBLEM — M10.9 GOUT: Status: ACTIVE | Noted: 2022-06-02

## 2025-07-01 PROBLEM — E55.9 VITAMIN D DEFICIENCY: Status: ACTIVE | Noted: 2022-06-02

## 2025-07-01 PROBLEM — G43.909 MIGRAINE HEADACHE: Status: ACTIVE | Noted: 2022-06-02

## 2025-07-01 PROBLEM — F41.1 GENERALIZED ANXIETY DISORDER: Status: ACTIVE | Noted: 2022-06-02

## 2025-07-01 PROBLEM — I20.89 CHRONIC STABLE ANGINA: Status: RESOLVED | Noted: 2022-07-14 | Resolved: 2025-07-01

## 2025-07-01 PROBLEM — K76.0 STEATOSIS OF LIVER: Status: ACTIVE | Noted: 2022-06-02

## 2025-07-01 PROBLEM — M19.042 OSTEOARTHRITIS OF BOTH HANDS: Status: ACTIVE | Noted: 2020-01-01

## 2025-07-01 PROBLEM — M19.041 OSTEOARTHRITIS OF BOTH HANDS: Status: ACTIVE | Noted: 2020-01-01

## 2025-07-01 PROCEDURE — 93000 ELECTROCARDIOGRAM COMPLETE: CPT | Performed by: NURSE PRACTITIONER

## 2025-07-01 PROCEDURE — 1159F MED LIST DOCD IN RCRD: CPT | Performed by: NURSE PRACTITIONER

## 2025-07-01 PROCEDURE — 3075F SYST BP GE 130 - 139MM HG: CPT | Performed by: NURSE PRACTITIONER

## 2025-07-01 PROCEDURE — 3078F DIAST BP <80 MM HG: CPT | Performed by: NURSE PRACTITIONER

## 2025-07-01 PROCEDURE — 99214 OFFICE O/P EST MOD 30 MIN: CPT | Performed by: NURSE PRACTITIONER

## 2025-07-01 PROCEDURE — 1160F RVW MEDS BY RX/DR IN RCRD: CPT | Performed by: NURSE PRACTITIONER

## 2025-07-01 RX ORDER — METOPROLOL TARTRATE 25 MG/1
200 TABLET, FILM COATED ORAL ONCE
OUTPATIENT
Start: 2025-07-01 | End: 2025-07-01

## 2025-07-01 RX ORDER — NITROGLYCERIN 0.4 MG/1
0.4 TABLET SUBLINGUAL
OUTPATIENT
Start: 2025-07-01 | End: 2025-07-01

## 2025-07-01 RX ORDER — NITROGLYCERIN 0.4 MG/1
0.8 TABLET SUBLINGUAL
OUTPATIENT
Start: 2025-07-01

## 2025-07-01 RX ORDER — METOPROLOL TARTRATE 50 MG
50 TABLET ORAL
OUTPATIENT
Start: 2025-07-01

## 2025-07-01 RX ORDER — COLCHICINE 0.6 MG/1
0.6 TABLET ORAL AS NEEDED
COMMUNITY

## 2025-07-01 RX ORDER — METOPROLOL TARTRATE 100 MG/1
TABLET ORAL
Qty: 2 TABLET | Refills: 0 | Status: SHIPPED | OUTPATIENT
Start: 2025-07-01

## 2025-07-01 RX ORDER — METOPROLOL TARTRATE 25 MG/1
150 TABLET, FILM COATED ORAL ONCE
OUTPATIENT
Start: 2025-07-01

## 2025-07-01 RX ORDER — METOPROLOL TARTRATE 1 MG/ML
5 INJECTION, SOLUTION INTRAVENOUS
OUTPATIENT
Start: 2025-07-01

## 2025-07-01 RX ORDER — SODIUM CHLORIDE 9 MG/ML
40 INJECTION, SOLUTION INTRAVENOUS AS NEEDED
OUTPATIENT
Start: 2025-07-01

## 2025-07-01 RX ORDER — SODIUM CHLORIDE 0.9 % (FLUSH) 0.9 %
10 SYRINGE (ML) INJECTION AS NEEDED
OUTPATIENT
Start: 2025-07-01

## 2025-07-01 RX ORDER — SODIUM CHLORIDE 0.9 % (FLUSH) 0.9 %
10 SYRINGE (ML) INJECTION EVERY 12 HOURS SCHEDULED
OUTPATIENT
Start: 2025-07-01

## 2025-07-01 RX ORDER — METOPROLOL TARTRATE 25 MG/1
100 TABLET, FILM COATED ORAL ONCE
OUTPATIENT
Start: 2025-07-01

## 2025-07-01 RX ORDER — METOPROLOL TARTRATE 25 MG/1
50 TABLET, FILM COATED ORAL ONCE
OUTPATIENT
Start: 2025-07-01

## 2025-07-01 NOTE — PROGRESS NOTES
Chief Complaint  Follow-up and Coronary Artery Disease    Subjective            History of Present Illness  Robson Cohen is a 60-year-old male patient who presents to the office today for acute visit.    History of Present Illness  The patient presents for fatigue, dizziness, left arm pain, sleep apnea, and medication management.    He reports experiencing persistent fatigue, which has been ongoing for approximately 6 to 8 months. This fatigue is so severe that it often prevents him from engaging in daily activities, leading him to spend most of his time in bed. He also experiences intermittent dizziness.    His sleep quality is poor, characterized by frequent awakenings and a dry throat that necessitates drinking water. He was diagnosed with sleep apnea about 5 years ago and underwent an overnight test. However, he found the CPAP machine intolerable and discontinued its use.    He has recently noticed radiating pain in his left arm, extending from the chest to just above the elbow. He is unable to participate in a walking stress test due to foot and ankle damage. .    He has been on Plavix 75 mg daily since his heart attack in 2008. His current medications include aspirin 81 mg daily, amlodipine 2.5 mg for blood pressure control, metoprolol 50 mg daily for blood pressure and heart rate regulation, Repatha, Vascepa, and pravastatin for cholesterol management. He is scheduled for blood work later this week.    PAST SURGICAL HISTORY:  Heart catheterization at [facility] in 2018.  Stent placement following a heart attack in 2008.        PMH  Past Medical History:   Diagnosis Date    CAD (coronary artery disease)     Hyperlipidemia     Hypertension          ALLERGY  Allergies   Allergen Reactions    Atorvastatin Unknown (See Comments)    Calcium Unknown (See Comments)    Influenza Virus Vaccine Unknown (See Comments)          SURGICALHX  Past Surgical History:   Procedure Laterality Date    CAROTID  STENT      COLONOSCOPY      CORONARY STENT PLACEMENT      EAR TUBES      TENDON REPAIR Right 04/28/2019    ankle    TOE SURGERY Right 04/28/2019    great toe fusion          SOC  Social History     Socioeconomic History    Marital status:    Tobacco Use    Smoking status: Every Day     Current packs/day: 0.25     Types: Cigarettes, Pipe     Passive exposure: Current    Smokeless tobacco: Never   Vaping Use    Vaping status: Never Used   Substance and Sexual Activity    Alcohol use: No    Drug use: No    Sexual activity: Defer         FAMHX  Family History   Problem Relation Age of Onset    Heart attack Mother     Stroke Mother     Colon cancer Neg Hx           MEDSIGONLY  Current Outpatient Medications on File Prior to Visit   Medication Sig    amLODIPine (NORVASC) 2.5 MG tablet Take 1 tablet by mouth Daily.    aspirin 81 MG chewable tablet Chew 1 tablet Daily.    Cholecalciferol (Vitamin D-3) 25 MCG (1000 UT) capsule Take 1,000 Units by mouth Daily.    clopidogrel (PLAVIX) 75 MG tablet clopidogrel 75 mg tablet    colchicine 0.6 MG tablet Take 1 tablet by mouth As Needed.    febuxostat (ULORIC) 40 MG tablet Take 1 tablet by mouth Daily.    gabapentin (NEURONTIN) 400 MG capsule gabapentin 400 mg capsule    KlonoPIN 0.5 MG tablet Take 1 tablet by mouth Daily. (Patient taking differently: Take 1 tablet by mouth As Needed.)    Magnesium Glycinate (Magnesium Glycinate Advanced) 120 MG capsule Take 1 tablet by mouth 2 (Two) Times a Day.    metoprolol succinate XL (TOPROL-XL) 50 MG 24 hr tablet metoprolol succinate ER 50 mg tablet,extended release 24 hr    oxyCODONE (ROXICODONE) 10 MG tablet Take 1 tablet by mouth Every 3 (Three) Hours As Needed.    pravastatin (Pravachol) 40 MG tablet Take 1 tablet by mouth Daily.    Protonix 40 MG EC tablet Take 1 tablet by mouth. (Patient taking differently: Take 1 tablet by mouth As Needed.)    Repatha SureClick solution auto-injector SureClick injection INJECT 140mg  "SUBCUTANEOUSLY every TWO WEEKS    Vascepa 1 g capsule capsule Take 2 g by mouth 2 (Two) Times a Day With Meals.    vitamin D (ERGOCALCIFEROL) 1.25 MG (60907 UT) capsule capsule Take 1 capsule by mouth 1 (One) Time Per Week.    prasugrel (EFFIENT) 10 MG tablet Take 1 tablet by mouth Daily. (Patient not taking: Reported on 7/1/2025)    [DISCONTINUED] CeleBREX 200 MG capsule Take  by mouth.    [DISCONTINUED] nicotine (Nicoderm CQ) 7 MG/24HR patch Place 1 patch on the skin as directed by provider Daily. (Patient not taking: Reported on 4/9/2025)    [DISCONTINUED] nitroglycerin (NITROSTAT) 0.4 MG SL tablet Place 1 tablet under the tongue Every 5 (Five) Minutes As Needed. (Patient not taking: Reported on 7/1/2025)     No current facility-administered medications on file prior to visit.         Objective   /79   Pulse 70   Ht 182.9 cm (72.01\")   Wt 104 kg (230 lb 3.2 oz)   BMI 31.21 kg/m²       Physical Exam  HENT:      Head: Normocephalic.   Neck:      Vascular: No carotid bruit.   Cardiovascular:      Rate and Rhythm: Normal rate and regular rhythm.      Pulses: Normal pulses.      Heart sounds: Normal heart sounds. No murmur heard.  Pulmonary:      Effort: Pulmonary effort is normal.      Breath sounds: Normal breath sounds.   Musculoskeletal:      Cervical back: Neck supple.      Right lower leg: No edema.      Left lower leg: No edema.   Skin:     General: Skin is dry.   Neurological:      Mental Status: He is alert and oriented to person, place, and time.   Psychiatric:         Behavior: Behavior normal.       ECG 12 Lead    Date/Time: 7/1/2025 2:27 PM  Performed by: Nyasia Cruz APRN    Authorized by: Nyasia Cruz APRN  Comparison: compared with previous ECG from 8/24/2023  Similar to previous ECG  Rhythm: sinus rhythm  Rate: normal  BPM: 60  Conduction: conduction normal  ST Segments: ST segments normal  T Waves: T waves normal  QRS axis: normal  Other: no other findings    Clinical " "impression: normal ECG        Result Review :   The following data was reviewed by: RILEY Chavez on 07/01/2025:  No results found for: \"PROBNP\"     No results found for: \"TSH\"   No results found for: \"FREET4\"   No results found for: \"DDIMERQUANT\"  No results found for: \"MG\"   No results found for: \"DIGOXIN\"   No results found for: \"TROPONINT\"             Assessment and Plan    Diagnoses and all orders for this visit:    1. CAD S/P percutaneous coronary angioplasty (Primary)  -     ECG 12 Lead  -     CT Angiogram Coronary; Future  -     CT Angiogram Coronary-Cardiology Interpretation; Future  -     metoprolol tartrate (LOPRESSOR) tablet 200 mg  -     metoprolol tartrate (LOPRESSOR) tablet 150 mg  -     metoprolol tartrate (LOPRESSOR) tablet 100 mg  -     metoprolol tartrate (LOPRESSOR) tablet 50 mg  -     metoprolol tartrate (LOPRESSOR) tablet 50 mg  -     metoprolol tartrate (LOPRESSOR) injection 5 mg  -     nitroglycerin (NITROSTAT) SL tablet 0.4 mg  -     nitroglycerin (NITROSTAT) SL tablet 0.8 mg  -     No Caffeine or Nicotine 4 Hours Prior to CTA Appointment  -     Nothing to Eat or Drink 4 Hours Prior to CTA Appointment  -     Do Not Take Phosphodiasterase Inhibitors in the 72 Hours Prior to Coronary CTA  -     Obtain Informed Consent - Computed Tomography Angiography of Chest - Angiogram of Coronary Arteries; Standing  -     Vital Signs Upon Arrival; Standing  -     Cardiac Monitoring; Standing  -     Verify NPO Status - Patient to Be NPO at Least 4 Hours Prior to CTA; Standing  -     Notify CT After Administration of metoprolol tartrate (LOPRESSOR) tablet; Standing  -     Notify Provider If Total Metoprolol Given Equals 300mg & Heart Rate Not At Goal; Standing  -     Notify Provider Prior to Administration of Nitroglycerin if Patient SBP <80; Standing  -     POC Creatinine; Standing  -     Insert Peripheral IV; Standing  -     Saline Lock & Maintain IV Access; Standing  -     sodium chloride 0.9 % " flush 10 mL  -     sodium chloride 0.9 % flush 10 mL  -     sodium chloride 0.9 % infusion 40 mL  -     Vital Signs - See Instructions; Standing  -     Hold Medication Metformin (Glucophage, Glucophage XR, Fortament, Glumetza);  Metglip (metformin/glipizide);  Glucovance (metformin/glyburide); Avandamet (metformin/rosiglitazone); Standing  -     Patient May Discharge Home After Procedure Complete (If Stable); Standing  -     metoprolol tartrate (LOPRESSOR) 100 MG tablet; Take 100 mg at Bedtime the Night Before Coronary CTA Appointment and In the Morning 1 Hour Prior to Coronary CTA Appointment. Do not take if heart rate less than 60.  Dispense: 2 tablet; Refill: 0    2. Primary hypertension    3. Dyslipidemia    4. Suspected sleep apnea  -     Ambulatory Referral to Sleep Medicine  -     Holter Monitor - 48 Hour; Future      Assessment & Plan  1. Fatigue:  - Apply a 48-hour Holter monitor today to assess heart rate over this period.    2. Sleep apnea:  - Referral to sleep medicine for further evaluation and management.  - Discuss alternative treatment options, including different masks and oxygen delivery systems, and the possibility of surgical implantation of Inspire.    3. Radiating pain in the left arm:  - Order a CTA coronary to evaluate coronary vessels.  - Prescribe two pills of short-acting metoprolol: one to be taken the night before the CTA and the other on the morning of the procedure.  - Advise having someone accompany the patient for the procedure due to potential lightheadedness with a heart rate in the 50s.    4. Medication management:  - Continue current medications: aspirin 81 mg daily, Plavix 75 mg daily, amlodipine 2.5 mg, metoprolol 50 mg daily, Repatha, Vascepa, and pravastatin.    Discussed the risks, benefits, and alternatives of the proposed treatments. The patient was informed that the 48-hour Holter monitor will help determine if a low heart rate during sleep is contributing to fatigue. The  importance of treating sleep apnea was emphasized, including the potential for developing arrhythmias and organ dysfunction due to poor oxygenation. The CTA coronary was chosen over a repeat stress test for a more detailed evaluation of coronary vessels, with the understanding that it is less invasive than a heart catheterization but still provides valuable information. The patient was advised on the use of short-acting metoprolol to achieve optimal heart rate for imaging quality and the need for accompaniment due to potential side effects.    Follow-up:  - Await results of the 48-hour Holter monitor and CTA coronary.  - Schedule an appointment with sleep medicine for further evaluation and management of sleep apnea.    PROCEDURE  48-hour Holter monitor was placed today to measure heart rate over a 48-hour period.      Follow Up   Return in about 6 weeks (around 8/12/2025) for Follow up with Dr Hogue.    Patient was given instructions and counseling regarding his condition or for health maintenance advice. Please see specific information pulled into the AVS if appropriate.                Patient or patient representative verbalized consent for the use of Ambient Listening during the visit with  RILEY Chaevz for chart documentation. 7/2/2025  13:03 EDT    RILEY Chavez  07/01/25  14:27 EDT    Dictated Utilizing Dragon Dictation

## 2025-07-08 ENCOUNTER — TELEPHONE (OUTPATIENT)
Dept: CARDIOLOGY | Facility: HOSPITAL | Age: 60
End: 2025-07-08
Payer: MEDICARE

## 2025-07-08 DIAGNOSIS — I20.89 CHRONIC STABLE ANGINA: Primary | ICD-10-CM

## 2025-07-11 ENCOUNTER — RESULTS FOLLOW-UP (OUTPATIENT)
Dept: CARDIOLOGY | Facility: CLINIC | Age: 60
End: 2025-07-11
Payer: MEDICARE

## 2025-07-11 NOTE — TELEPHONE ENCOUNTER
Per Nyasia:  Holter monitor shows normal sinus rhythm with average heart rate 73 bpm. No arrhythmias noted. Continue current medications. Follow-up as scheduled.    Left message for patient to call office.

## 2025-07-14 ENCOUNTER — TELEPHONE (OUTPATIENT)
Dept: CARDIOLOGY | Facility: HOSPITAL | Age: 60
End: 2025-07-14

## 2025-08-08 ENCOUNTER — TELEPHONE (OUTPATIENT)
Dept: CARDIOLOGY | Facility: CLINIC | Age: 60
End: 2025-08-08
Payer: MEDICARE